# Patient Record
Sex: MALE | Race: WHITE | Employment: FULL TIME | URBAN - METROPOLITAN AREA
[De-identification: names, ages, dates, MRNs, and addresses within clinical notes are randomized per-mention and may not be internally consistent; named-entity substitution may affect disease eponyms.]

---

## 2019-03-30 ENCOUNTER — HOSPITAL ENCOUNTER (EMERGENCY)
Age: 19
Discharge: HOME | End: 2019-03-30
Attending: EMERGENCY MEDICINE
Payer: COMMERCIAL

## 2019-03-30 VITALS
DIASTOLIC BLOOD PRESSURE: 56 MMHG | RESPIRATION RATE: 16 BRPM | SYSTOLIC BLOOD PRESSURE: 124 MMHG | OXYGEN SATURATION: 97 % | BODY MASS INDEX: 26.6 KG/M2 | TEMPERATURE: 98.6 F | WEIGHT: 190 LBS | HEIGHT: 71 IN | HEART RATE: 76 BPM

## 2019-03-30 DIAGNOSIS — S61.011A THUMB LACERATION, RIGHT, INITIAL ENCOUNTER: Primary | ICD-10-CM

## 2019-03-30 PROCEDURE — 99282 EMERGENCY DEPT VISIT SF MDM: CPT

## 2019-03-30 ASSESSMENT — PAIN DESCRIPTION - ORIENTATION: ORIENTATION: RIGHT

## 2019-03-30 ASSESSMENT — PAIN SCALES - GENERAL: PAINLEVEL_OUTOF10: 4

## 2019-03-30 ASSESSMENT — PAIN DESCRIPTION - LOCATION: LOCATION: FINGER (COMMENT WHICH ONE)

## 2019-03-30 NOTE — ED PROVIDER NOTES
[facility] ED  [address] [place] [address]  Phone: [phone]  Fax: [phone]       Attending Physician Attestation     I performed a history and physical examination of the patient and discussed management with the mid level provideer. I reviewed the mid level provider's note and agree with the documented findings and plan of care. Any areas of disagreement are noted on the chart. I was personally present for the key portions of any procedures. I have documented in the chart those procedures where I was not present during the key portions. I have reviewed the emergency nurses triage note. I agree with the chief complaint, past medical history, past surgical history, allergies, medications, social and family history as documented unless otherwise noted below. Documentation of the HPI, Physical Exam and Medical Decision Making performed by medical students or scribes is based on my personal performance of the HPI, PE and MDM. For Physician Assistant/ Nurse Practitioner cases/documentation I have personally evaluated this patient and have completed at least one if not all key elements of the E/M (history, physical exam, and MDM). Additional findings are as noted. Primary Care Physician:  Nahun Lino MD    CHIEF COMPLAINT       Chief Complaint   Patient presents with    Hand Laceration     Cut pad of right thumb on meat/cheese slicer at work while cutting a cucumber. Injury occurred yesterday. Decided to be seen today. No bleeding or distress noted. Flap avusion to tip of right thumb. Edges easily approximated. Avulsion flap pale. RECENT VITALS:   Temp: 98.6 °F (37 °C),  Pulse: 76, Resp: 16, BP: (!) 124/56    LABS:  Labs Reviewed - No data to display      PERTINENT ATTENDING PHYSICIAN COMMENTS:    Melissa Antonio is a 22 y.o. male who presents to professional avulsion type laceration of the distal portion of the thumb. He used glue to help seal the wound.   Not

## 2019-03-31 NOTE — ED PROVIDER NOTES
[facility]  [address] [place] [address]  Phone: [phone]  Fax: [phone]      eMERGENCY dEPARTMENT eNCOUnter      Pt Name: Glenis Pedraza  MRN: [id number]  [place] 2000  Date of evaluation: 3/30/19      CHIEF COMPLAINT:  Chief Complaint   Patient presents with    Hand Laceration     Cut pad of right thumb on meat/cheese slicer at work while cutting a cucumber. Injury occurred yesterday. Decided to be seen today. No bleeding or distress noted. Flap avusion to tip of right thumb. Edges easily approximated. Avulsion flap pale. HISTORY OF PRESENT ILLNESS    Glenis Pedraza is a 22 y.o. male who presents for a  WOUND EVALUATION:    Location/Symptom:    Right thumb laceration wound check  Timing/Onset:   1 day  Context/Setting:   Pt cut finger at work in the uiu yesterday. He used Gorilla Glue to close the wound. He is here for a wound check as flap is sticking up now. No bleeding/significant pain/discharge/other associated symptoms. Tetanus UTD. Quality:   As above  Duration:  constant  Modifying Factors:   pressure  Severity:   Mild/moderate    Nursing Notes were reviewed. REVIEW OF SYSTEMS       Constitutional:   Per HPI  Eyes: No visual changes. Neck: No neck pain. Respiratory: Denies recent shortness of breath. Cardiac:  Denies recent chest pain. GI:  No nausea. No vomiting. Denies abdominal pain/diarrhea. Musculoskeletal: Denies focal weakness. Neurologic:  Denies headache or focal weakness. Skin:   Per HPI    Negative in 10 essential Systems except as mentioned above and in the HPI. PAST MEDICAL HISTORY   PMH:  has no past medical history on file. Surgical History:  has no past surgical history on file. Social History:  reports that he has never smoked. He has never used smokeless tobacco. He reports that he does not drink alcohol or use drugs.   Family History: None  Psychiatric History: None    Allergies:has No Known Allergies. PHYSICAL EXAM     INITIAL VITALS: BP (!) 124/56   Pulse 76   Temp 98.6 °F (37 °C) (Oral)   Resp 16   Ht 5' 11\" (1.803 m)   Wt 86.2 kg (190 lb)   SpO2 97%   BMI 26.50 kg/m²     Constitutional:  Well developed   Eyes:  Pupils equal   HENT:  Atraumatic, external ears normal, nose normal  Respiratory:  Clear to auscultation bilaterally with good air exchange, no W/R/R  Cardiovascular:  RRR with normal S1 and S2  Musculoskeletal:    Full ROM of bilat UE/LE. Right distal thumb tip flap laceration, adhered down with glue, no erythema/discharge/significant TTP. Flap itself appears to elevated from underlying edema but entire area is drying/glued over. Integument:   Per MS exam  Neurologic:  Alert & oriented x 3, no focal deficits noted       DIAGNOSTIC RESULTS     EKG: All EKG's are interpreted by the Emergency Department Physician who either signs or Co-signs this chart in the absence of a cardiologist.  Not indicated    RADIOLOGY:   Reviewed the radiologist:  No orders to display     Not indicated      LABS:  Labs Reviewed - No data to display  Not indicated    EMERGENCY DEPARTMENT COURSE:     1849  Nothing to do here, well-adhered and no signs of infection. Continue to keep clean/dry and wash gently daily, cover for work. Pt agreeable to plan and my instructions. He was mainly concerned about the acute underlying edema that was pushing up the flap itself. No orders of the defined types were placed in this encounter. CONSULTS:  None      FINAL IMPRESSION      1.  Thumb laceration, right, initial encounter          DISPOSITION/PLAN:  DISPOSITION Decision To Discharge 03/30/2019 06:49:47 PM        PATIENT REFERRED TO:  Theron Shukla [address] [place] [address]  [phone]    Schedule an appointment as soon as possible for a visit in 3 days  for worsening of your symptoms      DISCHARGE MEDICATIONS:  There are no discharge medications for this patient.       (Please note that portions of this note were completed with a voice recognition program.  Efforts were made to edit the dictations but occasionally words are mis-transcribed.)    BETY Pittman PA-C  03/30/19 2022

## 2023-08-07 ENCOUNTER — HOSPITAL ENCOUNTER (OUTPATIENT)
Dept: PHYSICAL THERAPY | Facility: CLINIC | Age: 23
Setting detail: THERAPIES SERIES
Discharge: HOME | End: 2023-08-07
Payer: COMMERCIAL

## 2023-08-07 PROCEDURE — 97140 MANUAL THERAPY 1/> REGIONS: CPT

## 2023-08-07 PROCEDURE — 97110 THERAPEUTIC EXERCISES: CPT

## 2023-08-07 PROCEDURE — 97161 PT EVAL LOW COMPLEX 20 MIN: CPT

## 2023-08-07 NOTE — CONSULTS
Signature:________________________________Date:__________________  By signing above or cosigning this note, I have reviewed this plan of care and certify a need for medically necessary rehabilitation services.      *PLEASE SIGN ABOVE AND FAX BACK ALL PAGES*

## 2023-08-14 ENCOUNTER — HOSPITAL ENCOUNTER (OUTPATIENT)
Dept: PHYSICAL THERAPY | Facility: CLINIC | Age: 23
Setting detail: THERAPIES SERIES
Discharge: HOME | End: 2023-08-14
Payer: COMMERCIAL

## 2023-08-14 PROCEDURE — 97140 MANUAL THERAPY 1/> REGIONS: CPT

## 2023-08-14 PROCEDURE — 97110 THERAPEUTIC EXERCISES: CPT

## 2023-08-14 NOTE — FLOWSHEET NOTE
[] [address]  [address] [facility] [place] [facility].  [phone]  F: [phone] [] [address] [facility]  [address]   [address]  P: [phone]  F: [phone] [x] [address]  [address] [facility]  P: [phone]  F: [phone] [] [facility] [phone]  F: [phone] [] [address] [facility]  [address] [facility]   [address]   P: [phone]  F: [phone]  [] [address] [facility].   P: [phone]  F: [phone] [] [address] [facility]  [address] [facility] [address]  P: [phone]  F: [phone] [] [address] [facility]  [address] [facility]  [place]: [phone]  F: [phone] [] [address] [facility] [place] [facility] [address]  [place]: [phone]  F: [phone]      Physical Therapy Daily Treatment Note    Date:  2023  Patient Name:  Kusum Johnson    :  2000  MRN: [id number]  Physician: Karthik Ramirez MD                                   Insurance: [address] ([address] 15 VISTS)  Medical Diagnosis: Strain of other muscles, fascia and tendons at shoulder and upper arm level, right arm                    Rehab Codes: M54.2  Onset Date: 23               Next 's appt: unknown  Visit# / total visits: 2/15;     Progress note due at visit 10     Cancels/No Shows: 0/0    Subjective:    Pain:  [x] Yes  [] No Location: thoracic  Pain Rating: (0-10 scale) 3/10  Pain altered Tx:  [x] No  [] Yes  Action:  Comments: Patient was sore the day after his evaluation.  Patient reports

## 2023-08-21 ENCOUNTER — HOSPITAL ENCOUNTER (OUTPATIENT)
Dept: PHYSICAL THERAPY | Facility: CLINIC | Age: 23
Setting detail: THERAPIES SERIES
Discharge: HOME | End: 2023-08-21
Payer: COMMERCIAL

## 2023-08-21 PROCEDURE — 97140 MANUAL THERAPY 1/> REGIONS: CPT

## 2023-08-21 PROCEDURE — 97110 THERAPEUTIC EXERCISES: CPT

## 2023-08-21 NOTE — PROGRESS NOTES
[] [address]  [address] [facility].  [phone]  F: [phone] [] 204 [facility]  [address] [facility] [address]   [address]  P: [phone]  F: [phone] [x] [address]  151 [facility]  P: [phone]  F: [phone] [] [facility]: [phone]  F: [phone] [] 224 [facility]  [address] [facility]   [address]   P: [phone]  F: [phone]  [] [address] [facility].   P: [phone]  F: [phone] [] 205 [facility]  [address]  P: [phone]  F: [phone] [] [address] [facility]  7[address] [facility]  [place]: [phone]  F: [phone] [] [address] [facility] [place] [facility] [address]  [place]: [phone]  F: [phone]      Physical Therapy Daily Treatment Note/Possible D/C    Date:  2023  Patient Name:  Mae Nichols    :  2000  MRN: [id number]  Physician: Vinnie Lee MD                                   Insurance: [address] ([address] 15 VISTS)  Medical Diagnosis: Strain of other muscles, fascia and tendons at shoulder and upper arm level, right arm                    Rehab Codes: M54.2  Onset Date: 23               Next 's appt: unknown  Visit# / total visits: 3/15;     Progress note due at visit 10     Cancels/No Shows: 0/0    Subjective:    Pain:  [x] Yes  [] No Location: thoracic  Pain Rating: (0-10 scale) 2/10 (5/10 at worst)  Pain altered Tx:  [x] No  [] Yes  Action:  Comments: Patient states he has discomfort in the